# Patient Record
Sex: FEMALE | Race: WHITE | NOT HISPANIC OR LATINO | Employment: UNEMPLOYED | URBAN - NONMETROPOLITAN AREA
[De-identification: names, ages, dates, MRNs, and addresses within clinical notes are randomized per-mention and may not be internally consistent; named-entity substitution may affect disease eponyms.]

---

## 2022-07-25 ENCOUNTER — TRANSCRIBE ORDERS (OUTPATIENT)
Dept: PHYSICAL THERAPY | Facility: HOSPITAL | Age: 22
End: 2022-07-25

## 2022-07-25 DIAGNOSIS — M41.9 SCOLIOSIS, UNSPECIFIED SCOLIOSIS TYPE, UNSPECIFIED SPINAL REGION: Primary | ICD-10-CM

## 2022-09-15 ENCOUNTER — HOSPITAL ENCOUNTER (OUTPATIENT)
Dept: PHYSICAL THERAPY | Facility: HOSPITAL | Age: 22
Setting detail: THERAPIES SERIES
Discharge: HOME | End: 2022-09-15

## 2022-09-15 DIAGNOSIS — M41.9 SCOLIOSIS, UNSPECIFIED SCOLIOSIS TYPE, UNSPECIFIED SPINAL REGION: Primary | ICD-10-CM

## 2022-09-15 PROCEDURE — 97110 THERAPEUTIC EXERCISES: CPT

## 2022-09-15 PROCEDURE — 97161 PT EVAL LOW COMPLEX 20 MIN: CPT

## 2022-09-15 NOTE — THERAPY EVALUATION
Outpatient Physical Therapy Ortho Initial Evaluation  [facility]     Patient Name: Christen Nicholas  : 2000  MRN: [id number]  Today's Date: 9/15/2022      Visit Date: 09/15/2022   Patient seen for 1 PT sessions.  Patient reports N/A% of improvement.  Next MD appt: CECY   Recertification: 10/6/2022     Therapy Diagnosis: Thoracic and lumbar mobility deficits 2/2 R scoliosis     Barriers to Rehab: Include structural changes that have occurred within the spine, The chronicity of this issue.     Safety Issues: None noted.     No Known Allergies    There is no problem list on file for this patient.       Past Medical History:   Diagnosis Date   • Anxiety    • Depression         History reviewed. No pertinent surgical history.    Visit Dx:     ICD-10-CM ICD-9-CM   1. Scoliosis, unspecified scoliosis type, unspecified spinal region  M41.9 737.30          Patient History     Row Name 09/15/22 1300             History    Chief Complaint Pain;Numbness  -AC      Type of Pain Back pain  -AC      Brief Description of Current Complaint Pt reports she has scoliosis. Pt reports her pain has gotten worse over the past 2 years. Pt reports numbness in lower back and hips. Reports it is painful driving for long periods of time.  -AC      Patient/Caregiver Goals Relieve pain;Return to prior level of function;Improve mobility;Improve strength  -AC      Patient's Rating of General Health Good  -AC      Hand Dominance right-handed  -AC      Patient seeing anyone else for problem(s)? no  -AC      How has patient tried to help current problem? heating pad and ipubrofen  -AC      What clinical tests have you had for this problem? X-ray  -AC      Results of Clinical Tests see EMR  -AC      Surgery/Hospitalization No  -AC              Pain     Pain Location Back  -AC      Pain at Present 3  -AC      Pain at Best 2  -AC      Pain at Worst 6  -AC      Pain Frequency Intermittent  -AC      Pain Description Numbness;Aching;Dull  -AC       What Performance Factors Make the Current Problem(s) WORSE? sitting in the car, lifting objects  -AC      What Performance Factors Make the Current Problem(s) BETTER? stretching  -AC      Tolerance Time- Standing 10-15 mins  -AC      Tolerance Time- Sitting 30 mins; if in car immediate  -AC      Is your sleep disturbed? No  -AC      Is medication used to assist with sleep? No  -AC              Fall Risk Assessment    Any falls in the past year: No  -AC              Daily Activities    Primary Language English  -AC            User Key  (r) = Recorded By, (t) = Taken By, (c) = Cosigned By    Initials Name Provider Type    AC Carlota Franklin, PT Physical Therapist                 PT Ortho     Row Name 09/15/22 1400       Posture/Observations    Posture/Observations Comments Pt does not appear to be in any acute distress. Pt presents with R scoliosis. R shoulder slightly higher than L. R iliac crest higher than L. Pt demo increased forward head posture and rounded shoulders.  -AC       Sensory Screen for Light Touch- Upper Quarter Clearing    C4 (posterior shoulder) Bilateral:;Intact  -AC    C5 (lateral upper arm) Bilateral:;Intact  -AC    C6 (tip of thumb) Bilateral:;Intact  -AC    C7 (tip of 3rd finger) Bilateral:;Intact  -AC    C8 (tip of 5th finger) Bilateral:;Intact  -AC    T1 (medial lower arm) Bilateral:;Intact  -AC       Myotomal Screen- Upper Quarter Clearing    Shoulder flexion (C5) Bilateral:;4 (Good)  -AC    Elbow flexion/wrist extension (C6) Bilateral:;4 (Good)  -AC    Elbow extension/wrist flexion (C7) Bilateral:;4 (Good)  -AC    Finger flexion/ (C8) Bilateral:;4 (Good)  -AC    Finger abduction (T1) Bilateral:;4 (Good)  -AC     Bilateral:;4 (Good)  -AC       Cervical/Shoulder ROM Screen    Cervical flexion Normal  -AC    Cervical extension Normal  -AC    Cervical lateral flexion Normal  -AC    Cervical rotation Normal  -AC       Sensory Screen for Light Touch- Lower Quarter Clearing    L1  (inguinal area) Bilateral:;Intact  -AC    L2 (anterior mid thigh) Bilateral:;Intact  -AC    L3 (distal anterior thigh) Bilateral:;Intact  -AC    L4 (medial lower leg/foot) Bilateral:;Intact  -AC    L5 (lateral lower leg/great toe) Bilateral:;Intact  -AC    S1 (bottom of foot) Bilateral:;Intact  -AC       Myotomal Screen- Lower Quarter Clearing    Hip flexion (L2) Bilateral:;4 (Good)  -AC    Knee extension (L3) Bilateral:;5 (Normal)  -AC    Ankle DF (L4) Bilateral:;5 (Normal)  -AC    Great toe extension (L5) Bilateral:;5 (Normal)  -AC    Ankle PF (S1) Bilateral:;5 (Normal)  -AC    Knee flexion (S2) Bilateral:;5 (Normal)  -AC       Lumbar ROM Screen- Lower Quarter Clearing    Lumbar Flexion Impaired  65°  -AC    Lumbar Extension Impaired  20°  -AC    Lumbar Lateral Flexion Impaired  50% impairment  -AC    Lumbar Rotation Impaired  50% impairment  -AC       SI/Hip Screen- Lower Quarter Clearing    Los's/Roddy's test Bilateral:;Negative  -AC       Thoracic Accessory Motions    PA glide- T3 Hypomobile  -AC    PA glide- T4 Hypomobile  -AC    PA glide- T5 Hypomobile  -AC    PA glide- T6 Hypomobile  -AC    PA glide- T7 Hypomobile  -AC    PA glide- T8 Hypomobile  -AC    PA glide- T9 Hypomobile  -AC    PA glide- T10 Hypomobile  -AC    PA glide- T11 Hypomobile  -AC    PA glide- T12 Hypomobile  -AC       General ROM    GENERAL ROM COMMENTS AROM lumbar flex 65°, lumbar ext 20, 50% B lumbar lat flex, and lumbar rot; all B LE and UE AROM WFL. Thoracic flexion AROM 50%, thoracic extension 25%, thoracic B lat flex 50%, thoracic B rot 50%.  -AC       MMT (Manual Muscle Testing)    General MMT Comments All B UE MMT 4/5. All B LE MMT 5/5 except hip flexion 4/5.  -AC       Sensation    Sensation WNL? WNL  -AC    Row Name 09/15/22 1300       Subjective Comments    Subjective Comments see subjective history  -AC       Subjective Pain    Able to rate subjective pain? yes  -AC    Pre-Treatment Pain Level 3  -AC    Post-Treatment Pain  Level 1  -AC       Upper Extremity Flexibility    Pect Minor Bilateral:;Mildly limited  -AC    Pect Major Bilateral:;Mildly limited  -AC       Lower Extremity Flexibility    Hamstrings Moderately limited  -AC    Hip Flexors Moderately limited  -AC    Hip Internal Rotators Moderately limited  -AC    Quadratus Lumborum Bilateral:;Moderately limited  -AC       Balance Skills Training    Balance Comments Pt balance good. Pt able to sit independently with no UE support. Pt able to stand independently with no HHA or AD.  -AC       Transfers    Comment, (Transfers) I with all transfers.  -AC       Gait/Stairs (Locomotion)    Comment, (Gait/Stairs) I with ambulation into clinic. No AD requried. No gait deviations noted. Increased rounded shoulders and forward head posture present.  -          User Key  (r) = Recorded By, (t) = Taken By, (c) = Cosigned By    Initials Name Provider Type    Carlota Ulloa, PT Physical Therapist                            Therapy Education  Given: HEP, Symptoms/condition management, Posture/body mechanics  Program: New  How Provided: Demonstration, Written, Verbal  Provided to: Patient  Level of Understanding: Teach back education performed, Verbalized, Demonstrated      PT OP Goals     Row Name 09/15/22 1400          PT Short Term Goals    STG Date to Achieve 10/06/22  -     STG 1 Pt to be I with HEP with additions prior to next recertification.  -     STG 2 Pt to be able to perform 1 min plank with no difficulty or pain present.  -     STG 3 Pt to demo good lifting mechanics with no cueing from PT.  -     STG 4 Pt to be able to tolerate >/= 10 mins seated in car with no pain reported.  -     STG 5 Pt to improve lumbar flexion AROM to >/= 75°.  -            Long Term Goals    LTG 1 Pt to be I with final HEP with good form and no cueing from PT.  -     LTG 2 Pt to subjectively report 75% improvement since initial eval indicating improved function.  -     LTG 3 Pt to  improve B UE and B LE MMT to 5/5.  -AC     LTG 4 Pt to reduce EFREN score to </= 5% indicating no impairments and able to perform all daily tasks with no difficulty.  -     LTG 5 Pt to report no tenderness to B QL upon palpation indicating symptom reduction.  -AC            Time Calculation    PT Goal Re-Cert Due Date 10/06/22  -           User Key  (r) = Recorded By, (t) = Taken By, (c) = Cosigned By    Initials Name Provider Type     Carlota Franklin, PT Physical Therapist                 PT Assessment/Plan     Row Name 09/15/22 1400          PT Assessment    Functional Limitations Performance in self-care ADL;Performance in sport activities;Limitation in home management;Performance in leisure activities  -     Impairments Impaired flexibility;Impaired muscle endurance;Impaired muscle length;Impaired muscle power;Joint mobility;Pain;Muscle strength;Posture;Poor body mechanics;Range of motion  -     Assessment Comments Ms. Velásquez is a 22 y.o. female who presents to PT with R scoliosis. Pt has structural and postural deformities with R side higher than the L. Pt presents with decreased thoracic and lumbar AROM, B UE strength deficits, poor postural awareness, and tenderness and taut bands to L erector spinae and B QL L>R. Pt also has hypomobility through T-spine when performing PA glides. Pt reports her goal for PT is to be able to perform all activities with no pain especially driving in her car. Pt would benefit from skilled PT to address the deficits listed above and increase pt mobility, flexibility, and strength and prevention to worsening symptoms. Pt was provided with HEP this date. Pt educated on POC and symptom management.  -AC     Please refer to paper survey for additional self-reported information Yes  -AC     Rehab Potential Good  -AC     Patient/caregiver participated in establishment of treatment plan and goals Yes  -AC     Patient would benefit from skilled therapy intervention Yes  -AC       "      PT Plan    PT Frequency 2x/week  -AC     Predicted Duration of Therapy Intervention (PT) 6-8 visits  -AC     Planned CPT's? PT EVAL LOW COMPLEXITY: 47613;PT RE-EVAL: 44932;PT THER PROC EA 15 MIN: 18674;PT THER ACT EA 15 MIN: 15014;PT MANUAL THERAPY EA 15 MIN: 28797;PT GAIT TRAINING EA 15 MIN: 76182;PT SELF CARE/HOME MGMT/TRAIN EA 15: 07237;PT HOT OR COLD PACK TREAT MCARE  -AC     PT Plan Comments Next visit assess leg length, and core stability. Progress overall flexibility and B UE/LE strength and thoracic and lumbar mobility. Next visit add side plank, postural re-ed exercises, and thoracic mobility.  -AC           User Key  (r) = Recorded By, (t) = Taken By, (c) = Cosigned By    Initials Name Provider Type     Carlota Franklin, PT Physical Therapist                   OP Exercises     Row Name 09/15/22 1400 09/15/22 1300          Subjective Comments    Subjective Comments -- see subjective history  -AC            Subjective Pain    Able to rate subjective pain? -- yes  -AC     Pre-Treatment Pain Level -- 3  -AC     Post-Treatment Pain Level -- 1  -AC            Exercise 1    Exercise Name 1 open books  -AC --     Reps 1 10 each side  -AC --     Time 1 5\" hold  -AC --     Additional Comments sidelying  -AC --            Exercise 2    Exercise Name 2 child's pose towards R  -AC --     Reps 2 2  -AC --     Time 2 30 sec hold  -AC --            Exercise 3    Exercise Name 3 angry cat stretch  -AC --     Reps 3 10  -AC --     Time 3 5\" hold  -AC --            Exercise 4    Exercise Name 4 St. HS stretch  -AC --            Exercise 5    Exercise Name 5 Shoulder squeezes  -AC --     Reps 5 10  -AC --     Time 5 3\" hold  -AC --            Exercise 6    Exercise Name 6 Doorway stretch  -AC --     Sets 6 2  -AC --     Time 6 30 sec hold  -AC --            Exercise 7    Exercise Name 7 Pt ed on POC, HEP, anatomical abnormalities, and symptom management  -AC --     Time 7 5 mins  -AC --           User Key  (r) = " Recorded By, (t) = Taken By, (c) = Cosigned By    Initials Name Provider Type     Carlota Franklin, PT Physical Therapist              Manual Rx (last 36 hours)     Manual Treatments     Row Name 09/15/22 1500             Manual Rx 1    Manual Rx 1 Location B ES assessment  -AC              Manual Rx 2    Manual Rx 2 Location B QL assessment  -AC              Manual Rx 3    Manual Rx 3 Location PA glide to T-spine (T1-T12)  -AC              Manual Rx 4    Manual Rx 4 Location PA glide to lumbar spine (L1-L5)  -AC            User Key  (r) = Recorded By, (t) = Taken By, (c) = Cosigned By    Initials Name Provider Type     Carlota Franklin, PT Physical Therapist                            Outcome Measure Options: Modified Oswestry  Modified Oswestry  Modified Oswestry Score/Comments: 8/50 = 16%      Time Calculation:     Start Time: 1348  Stop Time: 1443  Time Calculation (min): 55 min     Therapy Charges for Today     Code Description Service Date Service Provider Modifiers Qty    46081775304 HC PT THER SUPP EA 15 MIN 9/15/2022 Carlota Franklin, PT GP 2    39285647318 HC PT EVAL LOW COMPLEXITY 2 9/15/2022 FranklinCarlota, PT GP 1    19919791268 HC PT THER PROC EA 15 MIN 9/15/2022 Carlota Franklin, PT GP 2          PT G-Codes  Outcome Measure Options: Modified Oswestry  Modified Oswestry Score/Comments: 8/50 = 16%         Carlota Franklin PT , DPT 9/15/2022

## 2022-09-16 ENCOUNTER — HOSPITAL ENCOUNTER (OUTPATIENT)
Dept: PHYSICAL THERAPY | Facility: HOSPITAL | Age: 22
Setting detail: THERAPIES SERIES
Discharge: HOME | End: 2022-09-16

## 2022-09-16 DIAGNOSIS — M41.9 SCOLIOSIS, UNSPECIFIED SCOLIOSIS TYPE, UNSPECIFIED SPINAL REGION: Primary | ICD-10-CM

## 2022-09-16 PROCEDURE — 97110 THERAPEUTIC EXERCISES: CPT

## 2022-09-16 NOTE — THERAPY TREATMENT NOTE
"    Outpatient Physical Therapy Ortho Treatment Note  [facility]     Patient Name: Christen Nicholas  : 2000  MRN: [id number]  Today's Date: 2022      Visit Date: 2022   Patient seen for 2 PT sessions.  Patient reports N/A% of improvement.  Next MD appt: CECY   Recertification: 10/6/2022     Therapy Diagnosis: Thoracic and lumbar mobility deficits 2/2 R scoliosis      Barriers to Rehab: Include structural changes that have occurred within the spine, The chronicity of this issue.     Safety Issues: None noted.     Visit Dx:    ICD-10-CM ICD-9-CM   1. Scoliosis, unspecified scoliosis type, unspecified spinal region  M41.9 737.30       There is no problem list on file for this patient.       Past Medical History:   Diagnosis Date   • Anxiety    • Depression         No past surgical history on file.     PT Ortho     Row Name 22 07       Subjective Comments    Subjective Comments Pt reports she is doing okay this AM. Reports no issues since eval yesterday. Reports exercises are going well. \"Feels better for sure\".  -AC       Subjective Pain    Able to rate subjective pain? yes  -AC    Pre-Treatment Pain Level 3  -AC    Post-Treatment Pain Level 2  -AC          User Key  (r) = Recorded By, (t) = Taken By, (c) = Cosigned By    Initials Name Provider Type    Carlota Ulloa, PT Physical Therapist                             PT Assessment/Plan     Row Name 22          PT Assessment    Assessment Comments Pt tolerated tx well this date with no adverse response. Pt required tactile and verbal cues during low tband row for postural correction. Once corrected form improved. Pt responded well to manual overpressure with R SB over bolster for increased stretch to L side of trunk. Reinforced HEP and symptom management at end of tx.  -AC     Rehab Potential Good  -AC     Patient/caregiver participated in establishment of treatment plan and goals Yes  -AC     Patient would benefit from " "skilled therapy intervention Yes  -AC            PT Plan    PT Frequency 2x/week  -AC     PT Plan Comments Next visit add seated SB stretch, continue core stability exercises, single arm row. Continue tspine mobility.  -AC           User Key  (r) = Recorded By, (t) = Taken By, (c) = Cosigned By    Initials Name Provider Type    AC Carlota Franklin, PT Physical Therapist                   OP Exercises     Row Name 09/16/22 0700             Subjective Comments    Subjective Comments Pt reports she is doing okay this AM. Reports no issues since eval yesterday. Reports exercises are going well. \"Feels better for sure\".  -AC              Subjective Pain    Able to rate subjective pain? yes  -AC      Pre-Treatment Pain Level 3  -AC      Post-Treatment Pain Level 2  -AC              Exercise 1    Exercise Name 1 Pro II LE bike for strength, muscle re-ed, and warmup  -AC      Time 1 10 mins  -AC      Additional Comments L 4.0  -AC              Exercise 2    Exercise Name 2 T band low row  -AC      Sets 2 2  -AC      Reps 2 10  -AC      Additional Comments RTB  -AC              Exercise 3    Exercise Name 3 R SB over towel roll with manual stretch  -AC      Time 3 5 mins  -AC      Additional Comments manual overpressure by PT  -AC              Exercise 4    Exercise Name 4 open books  -AC      Reps 4 10 each side  -AC      Time 4 5\" hold  -AC      Additional Comments sidelying  -AC              Exercise 5    Exercise Name 5 child's pose towards R side  -AC      Reps 5 2  -AC      Time 5 1 min hold  -AC              Exercise 6    Exercise Name 6 angry cat stretch  -AC      Reps 6 20  -AC      Time 6 5\" hold  -AC              Exercise 7    Exercise Name 7 St. HS stretch  -AC      Sets 7 2  -AC      Time 7 30 sec hold  -AC              Exercise 8    Exercise Name 8 Half kneel side plank on R side  -AC      Sets 8 4  -AC      Time 8 20 sec hold  -AC              Exercise 9    Exercise Name 9 PPT  -AC      Reps 9 20  -AC      " "Time 9 5\" hold  -AC              Exercise 10    Exercise Name 10 Doorway stretch  -      Sets 10 2  -AC      Time 10 1 min hold  -AC              Exercise 11    Exercise Name 11 half kneeling hip flexor stretch with Side bend  -      Reps 11 2  -AC      Time 11 1 min hold  -AC      Additional Comments towards R side  -            User Key  (r) = Recorded By, (t) = Taken By, (c) = Cosigned By    Initials Name Provider Type     Carlota Franklin, PT Physical Therapist                              PT OP Goals     Row Name 09/16/22 0700          PT Short Term Goals    STG Date to Achieve 10/06/22  -     STG 1 Pt to be I with HEP with additions prior to next recertification.  -     STG 2 Pt to be able to perform 1 min plank with no difficulty or pain present.  -     STG 3 Pt to demo good lifting mechanics with no cueing from PT.  -     STG 4 Pt to be able to tolerate >/= 10 mins seated in car with no pain reported.  -     STG 5 Pt to improve lumbar flexion AROM to >/= 75°.  -            Long Term Goals    LTG 1 Pt to be I with final HEP with good form and no cueing from PT.  -     LTG 2 Pt to subjectively report 75% improvement since initial eval indicating improved function.  -     LTG 3 Pt to improve B UE and B LE MMT to 5/5.  -     LTG 4 Pt to reduce EFREN score to </= 5% indicating no impairments and able to perform all daily tasks with no difficulty.  -     LTG 5 Pt to report no tenderness to B QL upon palpation indicating symptom reduction.  -            Time Calculation    PT Goal Re-Cert Due Date 10/06/22  -           User Key  (r) = Recorded By, (t) = Taken By, (c) = Cosigned By    Initials Name Provider Type     Carlota Franklin, PT Physical Therapist                               Time Calculation:   Start Time: 0745  Stop Time: 0832  Time Calculation (min): 47 min  Therapy Charges for Today     Code Description Service Date Service Provider Modifiers Qty    61016122904  PT " THER SUPP EA 15 MIN 9/16/2022 Copeland, Autumn, PT GP 1    87982162650 HC PT THER PROC EA 15 MIN 9/16/2022 Copeland, Autumn, PT GP 3                    Autumn Franklin, PT , DPT 9/16/2022

## 2022-09-22 ENCOUNTER — APPOINTMENT (OUTPATIENT)
Dept: PHYSICAL THERAPY | Facility: HOSPITAL | Age: 22
End: 2022-09-22

## 2022-09-23 ENCOUNTER — APPOINTMENT (OUTPATIENT)
Dept: PHYSICAL THERAPY | Facility: HOSPITAL | Age: 22
End: 2022-09-23

## 2022-09-27 ENCOUNTER — HOSPITAL ENCOUNTER (OUTPATIENT)
Dept: PHYSICAL THERAPY | Facility: HOSPITAL | Age: 22
Setting detail: THERAPIES SERIES
Discharge: HOME | End: 2022-09-27

## 2022-09-27 DIAGNOSIS — M41.9 SCOLIOSIS, UNSPECIFIED SCOLIOSIS TYPE, UNSPECIFIED SPINAL REGION: Primary | ICD-10-CM

## 2022-09-27 PROCEDURE — 97110 THERAPEUTIC EXERCISES: CPT

## 2022-09-27 NOTE — THERAPY TREATMENT NOTE
Outpatient Physical Therapy Ortho Treatment Note  [facility]     Patient Name: Christen Nicholas  : 2000  MRN: [id number]  Today's Date: 2022      Visit Date: 2022  Patient seen for 3 PT sessions.  Patient reports N/A% of improvement.  Next MD appt: CECY   Recertification: 10/6/2022     Therapy Diagnosis: Thoracic and lumbar mobility deficits 2/2 R scoliosis      Barriers to Rehab: Include structural changes that have occurred within the spine, The chronicity of this issue.     Safety Issues: None noted.     Visit Dx:    ICD-10-CM ICD-9-CM   1. Scoliosis, unspecified scoliosis type, unspecified spinal region  M41.9 737.30       There is no problem list on file for this patient.       Past Medical History:   Diagnosis Date   • Anxiety    • Depression         No past surgical history on file.     PT Ortho     Row Name 22 1300       Subjective Comments    Subjective Comments Pt reports no issues over the weekend. Pt states her HEP is going good. No other new complaints.  -AC       Subjective Pain    Able to rate subjective pain? yes  -AC    Pre-Treatment Pain Level 3  -AC    Post-Treatment Pain Level 2  -AC          User Key  (r) = Recorded By, (t) = Taken By, (c) = Cosigned By    Initials Name Provider Type    Carlota Ulloa, PT Physical Therapist                             PT Assessment/Plan     Row Name 22 1400          PT Assessment    Assessment Comments Pt with good effort this date. Pt required additional cues for all exercises due to continued poor postural awareness. Will require continue reinforcement of proper scapular retraction with rows.  -AC     Rehab Potential Good  -AC     Patient/caregiver participated in establishment of treatment plan and goals Yes  -AC     Patient would benefit from skilled therapy intervention Yes  -AC            PT Plan    PT Frequency 2x/week  -AC     PT Plan Comments Next visit add fwd plank, bird dogs. Assess erector spinae musculature.  "MT as needed. Attempt lumbar roll.  -AC           User Key  (r) = Recorded By, (t) = Taken By, (c) = Cosigned By    Initials Name Provider Type    Carlota Ulloa PT Physical Therapist                   OP Exercises     Row Name 09/27/22 1300             Subjective Comments    Subjective Comments Pt reports no issues over the weekend. Pt states her HEP is going good. No other new complaints.  -AC              Subjective Pain    Able to rate subjective pain? yes  -AC      Pre-Treatment Pain Level 3  -AC      Post-Treatment Pain Level 2  -AC              Exercise 1    Exercise Name 1 Pro II LE bike for strength, muscle re-ed, and warmup  -AC      Time 1 10 mins  -AC      Additional Comments L 4.0  -AC              Exercise 2    Exercise Name 2 T band single arm row  -AC      Reps 2 20 each side  -AC      Additional Comments RTB  -AC              Exercise 3    Exercise Name 3 R SB over bolster  -AC      Time 3 3 mins  -AC              Exercise 4    Exercise Name 4 open books  -AC      Reps 4 10 each side  -AC      Time 4 5\" hold  -AC      Additional Comments sidelying  -AC              Exercise 5    Exercise Name 5 child's pose towards R side  -AC      Reps 5 2  -AC      Time 5 1 min hold  -AC              Exercise 6    Exercise Name 6 angry cat stretch  -AC      Reps 6 20  -AC      Time 6 5\" hold  -AC              Exercise 7    Exercise Name 7 St. HS stretch  -AC      Sets 7 2  -AC      Time 7 30 sec hold  -AC              Exercise 8    Exercise Name 8 Half kneel side plank on R side  -AC      Sets 8 4  -AC      Time 8 20 sec hold  -AC              Exercise 9    Exercise Name 9 Bridge  -AC      Sets 9 3  -AC      Reps 9 10  -AC      Time 9 3\" hold  -AC              Exercise 10    Exercise Name 10 thread the needle  -AC      Sets 10 --  -AC      Reps 10 10 each side  -AC      Time 10 --  -AC              Exercise 11    Exercise Name 11 --  -AC      Sets 11 --  -AC      Reps 11 --  -AC      Time 11 --  -AC         "    User Key  (r) = Recorded By, (t) = Taken By, (c) = Cosigned By    Initials Name Provider Type    Carlota Ulloa PT Physical Therapist                              PT OP Goals     Row Name 09/27/22 1400 09/27/22 1300       PT Short Term Goals    STG Date to Achieve 10/06/22  -AC --    STG 1 Pt to be I with HEP with additions prior to next recertification.  -AC --    STG 1 Progress Progressing  -AC --    STG 2 Pt to be able to perform 1 min plank with no difficulty or pain present.  -AC --    STG 2 Progress Progressing  -AC --    STG 3 Pt to demo good lifting mechanics with no cueing from PT.  -AC --    STG 4 Pt to be able to tolerate >/= 10 mins seated in car with no pain reported.  -AC --    STG 5 Pt to improve lumbar flexion AROM to >/= 75°.  -AC --       Long Term Goals    LTG 1 Pt to be I with final HEP with good form and no cueing from PT.  -AC --    LTG 2 Pt to subjectively report 75% improvement since initial eval indicating improved function.  -AC --    LTG 3 Pt to improve B UE and B LE MMT to 5/5.  -AC --    LTG 4 Pt to reduce EFREN score to </= 5% indicating no impairments and able to perform all daily tasks with no difficulty.  -AC --    LTG 5 Pt to report no tenderness to B QL upon palpation indicating symptom reduction.  -AC --       Time Calculation    PT Goal Re-Cert Due Date -- 10/06/22  -          User Key  (r) = Recorded By, (t) = Taken By, (c) = Cosigned By    Initials Name Provider Type    Carlota Ulloa PT Physical Therapist                               Time Calculation:   Start Time: 1348  Stop Time: 1433  Time Calculation (min): 45 min  Therapy Charges for Today     Code Description Service Date Service Provider Modifiers Qty    48083289662 HC PT THER SUPP EA 15 MIN 9/27/2022 Carlota Franklin PT GP 1    76860342452 HC PT THER PROC EA 15 MIN 9/27/2022 Carlota Franklin PT GP 3                    Carlota Franklin PT , DPT 9/27/2022

## 2022-09-29 ENCOUNTER — HOSPITAL ENCOUNTER (OUTPATIENT)
Dept: PHYSICAL THERAPY | Facility: HOSPITAL | Age: 22
Setting detail: THERAPIES SERIES
Discharge: HOME | End: 2022-09-29

## 2022-09-29 DIAGNOSIS — M41.9 SCOLIOSIS, UNSPECIFIED SCOLIOSIS TYPE, UNSPECIFIED SPINAL REGION: Primary | ICD-10-CM

## 2022-09-29 PROCEDURE — 97110 THERAPEUTIC EXERCISES: CPT

## 2022-09-29 NOTE — THERAPY TREATMENT NOTE
Outpatient Physical Therapy Ortho Treatment Note  [facility]     Patient Name: Christen Nicholas  : 2000  MRN: [id number]  Today's Date: 2022     Pt seen for 4 PT sessions  Reported Improvement:  N/A %  MD Visit: TBD  Recheck Date: 10/06/2022    Therapy Diagnosis:   Thoracic and lumbar mobility deficits 2/2 R scoliosis         Visit Date: 2022    Visit Dx:    ICD-10-CM ICD-9-CM   1. Scoliosis, unspecified scoliosis type, unspecified spinal region  M41.9 737.30       There is no problem list on file for this patient.       Past Medical History:   Diagnosis Date   • Anxiety    • Depression         No past surgical history on file.                     PT Assessment/Plan     Row Name 22 1400          PT Assessment    Assessment Comments Pt with good effort with stretches.  Requires cues for standing scap stab for postural improvements.  Performed full plank with scapular winging.  Modified to low plank with improved scap / posture stabilization.   Tolerated addition of new scap stab with tband  therex well.  -JW            PT Plan    PT Frequency 2x/week  -JW     PT Plan Comments Next visit attemp Bird Dogs.  -           User Key  (r) = Recorded By, (t) = Taken By, (c) = Cosigned By    Initials Name Provider Type    Anum Winters, [place] Physical Therapist Assistant                   OP Exercises     Row Name 22 1400             Subjective Comments    Subjective Comments Pt states she is a little sore but not too bad.  -              Subjective Pain    Able to rate subjective pain? yes  -      Pre-Treatment Pain Level 3  -      Post-Treatment Pain Level 2  -              Exercise 1    Exercise Name 1 Pro II LE bike for strength, muscle re-ed, and warmup  -JW      Time 1 10 mins  -JW              Exercise 2    Exercise Name 2 B st HS st  -JW      Reps 2 2  -JW      Time 2 30  -JW              Exercise 3    Exercise Name 3 Bridges  -JW      Sets 3 2  -JW      Reps 3 10  -JW    "           Exercise 4    Exercise Name 4 90/90 Heel taps  -JW      Sets 4 2  -JW      Reps 4 10  -JW              Exercise 5    Exercise Name 5 OPen Books  -JW      Reps 5 10 ea side  -JW      Time 5 5\" hold  -JW              Exercise 6    Exercise Name 6 Child's Pose - toward R side  -JW      Reps 6 2  -JW      Time 6 1 min hold  -JW              Exercise 7    Exercise Name 7 Angry cat st  -JW      Sets 7 2  -JW      Reps 7 5  -JW              Exercise 8    Exercise Name 8 Low plank  -JW      Reps 8 5  -JW      Time 8 10\" hold  -JW              Exercise 9    Exercise Name 9 Tband Rows; Low  -JW      Sets 9 2  -JW      Reps 9 10  -JW      Additional Comments GTB  -JW              Exercise 10    Exercise Name 10 Tband Clocks  -JW      Reps 10 10  -JW      Additional Comments RTB  -JW            User Key  (r) = Recorded By, (t) = Taken By, (c) = Cosigned By    Initials Name Provider Type    Anum Winters PTA Physical Therapist Assistant                              PT OP Goals     Row Name 09/29/22 1400          Time Calculation    PT Goal Re-Cert Due Date 10/06/22  -JW           User Key  (r) = Recorded By, (t) = Taken By, (c) = Cosigned By    Initials Name Provider Type    Anum Winters PTA Physical Therapist Assistant                Therapy Education  Education Details: Door loop, red tband for low rows and bows/arrows.  Given: HEP  Program: Reinforced, Progressed, New  How Provided: Verbal, Demonstration, Written  Provided to: Patient  Level of Understanding: Verbalized, Teach back education performed, Demonstrated              Time Calculation:   Start Time: 1349  Stop Time: 1440  Time Calculation (min): 51 min  Therapy Charges for Today     Code Description Service Date Service Provider Modifiers Qty    53489786448 HC PT THER PROC EA 15 MIN 9/29/2022 Anum Villalobos PTA GP, CQ 3    82999096511 HC PT THER SUPP EA 15 MIN 9/29/2022 Anum Villalobos PTA GP, CQ 1                    Anum Villalobos, " PTA  9/29/2022

## 2022-10-05 ENCOUNTER — HOSPITAL ENCOUNTER (OUTPATIENT)
Dept: PHYSICAL THERAPY | Facility: HOSPITAL | Age: 22
Setting detail: THERAPIES SERIES
Discharge: HOME | End: 2022-10-05

## 2022-10-05 DIAGNOSIS — M41.9 SCOLIOSIS, UNSPECIFIED SCOLIOSIS TYPE, UNSPECIFIED SPINAL REGION: Primary | ICD-10-CM

## 2022-10-05 PROCEDURE — 97110 THERAPEUTIC EXERCISES: CPT

## 2022-10-05 NOTE — THERAPY TREATMENT NOTE
Outpatient Physical Therapy Ortho Treatment Note  [facility]     Patient Name: Christen Nicholas  : 2000  MRN: [id number]  Today's Date: 10/5/2022     Pt seen for 5 PT sessions  Reported Improvement:  60 %  MD Visit: CECY  Recheck Date: 10/06/2022    Therapy Diagnosis:   Thoracic and lumbar mobility deficits 2/2 R scoliosis         Visit Date: 10/05/2022    Visit Dx:    ICD-10-CM ICD-9-CM   1. Scoliosis, unspecified scoliosis type, unspecified spinal region  M41.9 737.30       There is no problem list on file for this patient.       Past Medical History:   Diagnosis Date   • Anxiety    • Depression         No past surgical history on file.                     PT Assessment/Plan     Row Name 10/05/22 1500          PT Assessment    Assessment Comments Pt reports daily compliance with curretn HEP.  States she did utilize her stretches after increased pain from car ride yesterday.  Requires cues for level pelvis in QP position and struggles with bird dogs.  Increased time in low plank this date.  No reports of increased pain.  -            PT Plan    PT Frequency 2x/week  -     PT Plan Comments next visit add lifting ergonomic training  -           User Key  (r) = Recorded By, (t) = Taken By, (c) = Cosigned By    Initials Name Provider Type    Anum Winters PTA Physical Therapist Assistant                   OP Exercises     Row Name 10/05/22 1400             Subjective Comments    Subjective Comments Back is bothering me a little more than usual due to a lot of driving adn long itme spent in car yesterday.  -              Subjective Pain    Able to rate subjective pain? yes  -      Pre-Treatment Pain Level 5  -      Post-Treatment Pain Level 3  -              Exercise 1    Exercise Name 1 Pro II LE bike for strength, muscle re-ed, and warmup  -      Time 1 10 min  -      Additional Comments L 6.0  -              Exercise 2    Exercise Name 2 B st HS st  -      Reps 2 2  -JW       "Time 2 30  -JW              Exercise 3    Exercise Name 3 Open Books  -JW      Reps 3 10 each side  -JW      Time 3 5\" hold  -JW              Exercise 4    Exercise Name 4 DKTC over pball  -JW      Reps 4 20  -JW              Exercise 5    Exercise Name 5 Bridges over  pball  -JW      Sets 5 2  -JW      Reps 5 10  -JW      Time 5 5 \" hold  -JW              Exercise 6    Exercise Name 6 low planks  -JW      Reps 6 5  -JW      Time 6 20\" hold  -JW              Exercise 7    Exercise Name 7 Child's Pose - R side  -JW      Reps 7 2  -JW      Time 7 1 min ea  -JW              Exercise 8    Exercise Name 8 Thread the needle  -JW      Reps 8 10 reps ea side  -JW              Exercise 9    Exercise Name 9 Bird dogs  -JW      Sets 9 1  -JW      Reps 9 10  -JW              Exercise 10    Exercise Name 10 LTR  -JW      Reps 10 10  -JW      Time 10 10  -JW              Exercise 11    Exercise Name 11 Verbal review of tband scap stab HEP  -JW            User Key  (r) = Recorded By, (t) = Taken By, (c) = Cosigned By    Initials Name Provider Type    Anum Winters, PTA Physical Therapist Assistant                              PT OP Goals     Row Name 10/05/22 1400          PT Short Term Goals    STG Date to Achieve 10/06/22  -JW     STG 1 Pt to be I with HEP with additions prior to next recertification.  -JW     STG 1 Progress Met  -JW     STG 2 Pt to be able to perform 1 min plank with no difficulty or pain present.  -JW     STG 2 Progress Progressing  -     STG 3 Pt to demo good lifting mechanics with no cueing from PT.  -     STG 4 Pt to be able to tolerate >/= 10 mins seated in car with no pain reported.  -JW     STG 4 Progress Met  -JW     STG 4 Progress Comments 20-30 min tolerance reported  -     STG 5 Pt to improve lumbar flexion AROM to >/= 75°.  -            Long Term Goals    LTG 1 Pt to be I with final HEP with good form and no cueing from PT.  -JW     LTG 2 Pt to subjectively report 75% improvement since " initial eval indicating improved function.  -     LTG 2 Progress Progressing  -     LTG 3 Pt to improve B UE and B LE MMT to 5/5.  -     LTG 4 Pt to reduce EFREN score to </= 5% indicating no impairments and able to perform all daily tasks with no difficulty.  -     LTG 5 Pt to report no tenderness to B QL upon palpation indicating symptom reduction.  -            Time Calculation    PT Goal Re-Cert Due Date 10/06/22  -           User Key  (r) = Recorded By, (t) = Taken By, (c) = Cosigned By    Initials Name Provider Type    Anum Winters PTA Physical Therapist Assistant                Therapy Education  Education Details: Bird dogs  Given: Symptoms/condition management, Posture/body mechanics, HEP  Program: Reinforced, Progressed  How Provided: Verbal, Demonstration  Provided to: Patient  Level of Understanding: Verbalized, Teach back education performed              Time Calculation:   Start Time: 1430  Stop Time: 1514  Time Calculation (min): 44 min  Therapy Charges for Today     Code Description Service Date Service Provider Modifiers Qty    03526781634 HC PT THER PROC EA 15 MIN 10/5/2022 Anum Villalobos PTA GP, CQ 3    70847108143 HC PT THER SUPP EA 15 MIN 10/5/2022 Anum Villalobos PTA GP, CQ 1                    Anum Villalobos PTA  10/5/2022

## 2022-10-11 ENCOUNTER — HOSPITAL ENCOUNTER (OUTPATIENT)
Dept: PHYSICAL THERAPY | Facility: HOSPITAL | Age: 22
Setting detail: THERAPIES SERIES
Discharge: HOME | End: 2022-10-11

## 2022-10-11 DIAGNOSIS — M41.9 SCOLIOSIS, UNSPECIFIED SCOLIOSIS TYPE, UNSPECIFIED SPINAL REGION: Primary | ICD-10-CM

## 2022-10-11 PROCEDURE — 97110 THERAPEUTIC EXERCISES: CPT

## 2022-10-11 NOTE — THERAPY TREATMENT NOTE
Outpatient Physical Therapy Ortho Progress Note  [facility]     Patient Name: Christen Nicholas  : 2000  MRN: [id number]  Today's Date: 10/11/2022      Visit Date: 10/11/2022      Pt seen for 6 PT sessions  Reported Improvement:  60 %  MD Visit: TBD  Recheck Date: 2022      Therapy Diagnosis:   Thoracic and lumbar mobility deficits 2/2 R scoliosis     Visit Dx:    ICD-10-CM ICD-9-CM   1. Scoliosis, unspecified scoliosis type, unspecified spinal region  M41.9 737.30       There is no problem list on file for this patient.       Past Medical History:   Diagnosis Date   • Anxiety    • Depression         No past surgical history on file.     PT Ortho     Row Name 10/11/22 1300       Subjective Comments    Subjective Comments Reports back is feeling good today. states it only hurts the most when driving long distances. Pt subjectively reports 60% improvement since initial eval. Reports continued difficulty with sitting prolonged periods of time and core stability.  -AC       Subjective Pain    Able to rate subjective pain? yes  -AC    Pre-Treatment Pain Level 2  -AC    Post-Treatment Pain Level 2  -AC       Posture/Observations    Posture/Observations Comments Pt does not appear to be in any acute distress. Pt presents with R scoliosis. R shoulder slightly higher than L. R iliac crest higher than L. Pt demo increased forward head posture and rounded shoulders.  -AC       Cervical/Shoulder ROM Screen    Cervical flexion Normal  -AC    Cervical extension Normal  -AC    Cervical lateral flexion Normal  -AC    Cervical rotation Normal  -AC    Shoulder elevation  Normal  -AC       Head/Neck/Trunk    Trunk Extension AROM 25°  -AC    Trunk Flexion AROM 70°  -AC    Trunk Lt Lateral Flexion AROM 100%  -AC    Trunk Rt Lateral Flexion AROM 100%  -AC    Trunk Lt Rotation AROM 100%  -AC    Trunk Rt Rotation AROM 100%  -AC    Head/Neck/Trunk Comments All t-spine AROM 75% except t-spine ext 50%.  -AC       MMT (Manual  Muscle Testing)    General MMT Comments All B UE MMT 4+/5. All B LE MMT 5/5.  -AC       Sensation    Sensation WNL? WNL  -AC       Upper Extremity Flexibility    Pect Minor Bilateral:;Mildly limited  -AC    Pect Major Bilateral:;Mildly limited  -AC       Lower Extremity Flexibility    Hamstrings Moderately limited  -AC    Hip Flexors Moderately limited  -AC          User Key  (r) = Recorded By, (t) = Taken By, (c) = Cosigned By    Initials Name Provider Type     Carlota Franklin, PT Physical Therapist                             PT Assessment/Plan     Row Name 10/11/22 1400          PT Assessment    Assessment Comments Pt subjectively reports 60% improvement since initial eval. Pt has met 4/10 goals and is progressing well to meet remaining. Pt B LE strength has improved to 5/5 and B UE 4+/5. Pt also has improved trunk AROM. Pt continues to have difficulty with core stability and strength exercises such as low plank and bird dogs. Pt continue to lose TA activation and increased ant pelvic tilt with bird dogs this date thus replaced with quadruped arm raises. Pt able to maintain neutral spine and TA contraction with quadruped arm raise exercise. Pt would benefit from continued skilled PT to meet remaining goals and to improve function and reduction of symptoms. Added bridges to HEP.  -AC        PT Plan    PT Frequency 2x/week;1x/week;Other (comment)  Next week go down to 1x/week  -AC     PT Plan Comments Next visit add B QL stretch, superman's.  -           User Key  (r) = Recorded By, (t) = Taken By, (c) = Cosigned By    Initials Name Provider Type    Carlota Ulloa PT Physical Therapist                   OP Exercises     Row Name 10/11/22 1300             Subjective Comments    Subjective Comments Reports back is feeling good today. states it only hurts the most when driving long distances. Pt subjectively reports 60% improvement since initial eval. Reports continued difficulty with sitting prolonged  "periods of time and core stability.  -AC         Subjective Pain    Able to rate subjective pain? yes  -AC      Pre-Treatment Pain Level 2  -AC      Post-Treatment Pain Level 2  -AC         Exercise 1    Exercise Name 1 Pro II LE bike for strength, muscle re-ed, and warmup  -AC      Time 1 10 min  -AC      Additional Comments L 5.0  -AC         Exercise 2    Exercise Name 2 Re-eval  -AC      Reps 2 --  -AC      Time 2 --  -AC         Exercise 3    Exercise Name 3 Open Books  -AC      Reps 3 10 each side  -AC      Time 3 5\" hold  -AC         Exercise 4    Exercise Name 4 DKTC over pball  -AC      Reps 4 20  -AC         Exercise 5    Exercise Name 5 Bridges over  pball  -AC      Sets 5 2  -AC      Reps 5 10  -AC      Time 5 5 \" hold  -AC         Exercise 6    Exercise Name 6 low planks  -AC      Reps 6 5  -AC      Time 6 20\" hold  -AC         Exercise 7    Exercise Name 7 Child's Pose - R side  -AC      Reps 7 2  -AC      Time 7 1 min ea  -AC         Exercise 8    Exercise Name 8 Thread the needle  -AC      Reps 8 10 reps ea side  -AC         Exercise 9    Exercise Name 9 Quadruped opp arm raises  -AC      Sets 9 --  -AC      Reps 9 10 each side  -AC      Additional Comments + TA contraction  -AC         Exercise 10    Exercise Name 10 Ergonomics training  -AC      Time 10 5 mins  -AC         Exercise 11    Exercise Name 11 Discussion of POC and updated HEP  -AC         Exercise 12    Exercise Name 12 --  -AC            User Key  (r) = Recorded By, (t) = Taken By, (c) = Cosigned By    Initials Name Provider Type    AC Carlota Franklin, PT Physical Therapist                              PT OP Goals     Row Name 10/11/22 1300          PT Short Term Goals    STG Date to Achieve 10/06/22  -AC     STG 1 Pt to be I with HEP with additions prior to next recertification.  -AC     STG 1 Progress Met  -AC     STG 2 Pt to be able to perform 1 min plank with no difficulty or pain present.  -AC     STG 2 Progress Progressing  -AC  "    STG 3 Pt to demo good lifting mechanics with no cueing from PT.  -AC     STG 3 Progress Met  -AC     STG 4 Pt to be able to tolerate >/= 10 mins seated in car with no pain reported.  -AC     STG 4 Progress Met  -AC     STG 5 Pt to improve lumbar flexion AROM to >/= 75°.  -AC     STG 5 Progress Partially Met;Ongoing  -AC        Long Term Goals    LTG 1 Pt to be I with final HEP with good form and no cueing from PT.  -AC     LTG 2 Pt to subjectively report 75% improvement since initial eval indicating improved function.  -AC     LTG 2 Progress Progressing  -AC     LTG 3 Pt to improve B UE and B LE MMT to 5/5.  -AC     LTG 3 Progress Met  -AC     LTG 4 Pt to reduce EFREN score to </= 5% indicating no impairments and able to perform all daily tasks with no difficulty.  -AC     LTG 4 Progress Ongoing  -AC     LTG 5 Pt to report no tenderness to B QL upon palpation indicating symptom reduction.  -     LTG 5 Progress Ongoing  -        Time Calculation    PT Goal Re-Cert Due Date 11/01/22  -           User Key  (r) = Recorded By, (t) = Taken By, (c) = Cosigned By    Initials Name Provider Type     Carlota Franklin, LUZ Physical Therapist                     Outcome Measure Options: Modified Oswestry  Modified Oswestry  Modified Oswestry Score/Comments: 7/50 = 14%      Time Calculation:   Start Time: 1347  Stop Time: 1430  Time Calculation (min): 43 min  Therapy Charges for Today     Code Description Service Date Service Provider Modifiers Qty    37652192992 HC PT THER SUPP EA 15 MIN 10/11/2022 FranklinCarlota, PT GP 2    39977951680 HC PT THER PROC EA 15 MIN 10/11/2022 [place], PT GP 3          PT G-Codes  Outcome Measure Options: Modified Oswestry  Modified Oswestry Score/Comments: 7/50 = 14%         Carlota Franklin PT , DPT 10/11/2022

## 2022-10-13 ENCOUNTER — HOSPITAL ENCOUNTER (OUTPATIENT)
Dept: PHYSICAL THERAPY | Facility: HOSPITAL | Age: 22
Setting detail: THERAPIES SERIES
Discharge: HOME | End: 2022-10-13

## 2022-10-13 DIAGNOSIS — M41.9 SCOLIOSIS, UNSPECIFIED SCOLIOSIS TYPE, UNSPECIFIED SPINAL REGION: Primary | ICD-10-CM

## 2022-10-13 PROCEDURE — 97110 THERAPEUTIC EXERCISES: CPT

## 2022-10-13 NOTE — THERAPY TREATMENT NOTE
Outpatient Physical Therapy Ortho Treatment Note  [facility]     Patient Name: Christen Nicholas  : 2000  MRN: [id number]  Today's Date: 10/13/2022      Visit Date: 10/13/2022  Pt seen for 7 PT sessions  Reported Improvement:  60 %  MD Visit: TBD  Recheck Date: 2022      Therapy Diagnosis:   Thoracic and lumbar mobility deficits 2/2 R scoliosis     Visit Dx:    ICD-10-CM ICD-9-CM   1. Scoliosis, unspecified scoliosis type, unspecified spinal region  M41.9 737.30       There is no problem list on file for this patient.       Past Medical History:   Diagnosis Date   • Anxiety    • Depression         No past surgical history on file.     PT Ortho     Row Name 10/13/22 1400       Subjective Comments    Subjective Comments Reports she is in more pain today due to driving her sister to work in [place] a lot more.  -AC       Subjective Pain    Able to rate subjective pain? yes  -AC    Pre-Treatment Pain Level 4  -AC    Post-Treatment Pain Level 3  -AC          User Key  (r) = Recorded By, (t) = Taken By, (c) = Cosigned By    Initials Name Provider Type    Carlota Ulloa, PT Physical Therapist                             PT Assessment/Plan     Row Name 10/13/22 1300          PT Assessment    Assessment Comments Pt demo difficulty with side planks this date for increased time. Required cueing to maintain neutral spine during low planks. Continue progressing strength and mobility as tolerated. PT provided ed to patient on proper seat position in car and explained to place B towel rolls under hips to prevent increased sacral sitting. PT also ed pt to place pillow vertical behind bike for tactile cue to maintain good posture when driving in [place]. Pt verbalized understanding.  -AC        PT Plan    PT Frequency 1x/week  -AC     PT Plan Comments Next visit re-visit tband rows. Add KB single arm row.  -AC           User Key  (r) = Recorded By, (t) = Taken By, (c) = Cosigned By    Initials Name Provider  "Type    AC Carlota Franklin, PT Physical Therapist                   OP Exercises     Row Name 10/13/22 1400 10/13/22 1300          Subjective Comments    Subjective Comments Reports she is in more pain today due to driving her sister to work in [place] a lot more.  -AC --        Subjective Pain    Able to rate subjective pain? yes  -AC --     Pre-Treatment Pain Level 4  -AC --     Post-Treatment Pain Level 3  -AC --        Exercise 1    Exercise Name 1 -- Pro II LE bike for strength, muscle re-ed, and warmup  -AC     Time 1 -- 8 min  -AC     Additional Comments -- L 5.0  -AC        Exercise 2    Exercise Name 2 -- B st. HS stretch  -AC     Sets 2 -- 2  -AC     Time 2 -- 30 sec  -AC        Exercise 3    Exercise Name 3 -- seated QL stretch  -AC     Sets 3 -- 2  -AC     Time 3 -- 30 sec hold  -AC        Exercise 4    Exercise Name 4 -- DKTC over pball  -AC     Reps 4 -- 20  -AC        Exercise 5    Exercise Name 5 -- Bridges over  pball  -AC     Sets 5 -- 2  -AC     Reps 5 -- 10  -AC     Time 5 -- 5 \" hold  -AC        Exercise 6    Exercise Name 6 -- low planks  -AC     Reps 6 -- 2  -AC     Time 6 -- 30\" hold  -AC     Additional Comments -- Forward  -AC        Exercise 7    Exercise Name 7 -- low side plank  -AC     Reps 7 -- 2  -AC     Time 7 -- 30 sec hold  -AC        Exercise 8    Exercise Name 8 -- Thread the needle  -AC     Reps 8 -- 10 reps ea side  -AC        Exercise 9    Exercise Name 9 -- Quadruped opp arm raises  -AC     Reps 9 -- 10 each side  -AC     Additional Comments -- +TA contraction  -AC        Exercise 10    Exercise Name 10 -- Pt ed on seated position in car  -AC     Time 10 -- 5 mins  -AC        Exercise 11    Exercise Name 11 -- prone alt superman  -AC     Reps 11 -- 20  -AC     Time 11 -- 3\" hold  -AC        Exercise 12    Exercise Name 12 -- seated piriformis stretch  -AC     Reps 12 -- 2  -AC     Time 12 -- 30 sec hold  -AC        Exercise 13    Exercise Name 13 -- child pose stretch- " twds R side  -AC     Sets 13 -- 2  -AC     Time 13 -- 1 min each  -AC           User Key  (r) = Recorded By, (t) = Taken By, (c) = Cosigned By    Initials Name Provider Type    AC Carlota Franklin, PT Physical Therapist                              PT OP Goals     Row Name 10/13/22 1400 10/13/22 1300       PT Short Term Goals    STG Date to Achieve 10/06/22  -AC --    STG 1 Pt to be I with HEP with additions prior to next recertification.  -AC --    STG 1 Progress Met  -AC --    STG 2 Pt to be able to perform 1 min plank with no difficulty or pain present.  -AC --    STG 2 Progress Progressing  -AC --    STG 3 Pt to demo good lifting mechanics with no cueing from PT.  -AC --    STG 3 Progress Met  -AC --    STG 4 Pt to be able to tolerate >/= 10 mins seated in car with no pain reported.  -AC --    STG 4 Progress Met  -AC --    STG 5 Pt to improve lumbar flexion AROM to >/= 75°.  -AC --    STG 5 Progress Partially Met;Ongoing  -AC --       Long Term Goals    LTG 1 Pt to be I with final HEP with good form and no cueing from PT.  -AC --    LTG 2 Pt to subjectively report 75% improvement since initial eval indicating improved function.  -AC --    LTG 2 Progress Progressing  -AC --    LTG 3 Pt to improve B UE and B LE MMT to 5/5.  -AC --    LTG 3 Progress Met  -AC --    LTG 4 Pt to reduce EFREN score to </= 5% indicating no impairments and able to perform all daily tasks with no difficulty.  -AC --    LTG 4 Progress Ongoing  -AC --    LTG 5 Pt to report no tenderness to B QL upon palpation indicating symptom reduction.  -AC --    LTG 5 Progress Ongoing  -AC --       Time Calculation    PT Goal Re-Cert Due Date -- 11/01/22  -          User Key  (r) = Recorded By, (t) = Taken By, (c) = Cosigned By    Initials Name Provider Type    AC Carlota Franklin, PT Physical Therapist                               Time Calculation:   Start Time: 1351  Stop Time: 1430  Time Calculation (min): 39 min  Therapy Charges for Today     Code  Description Service Date Service Provider Modifiers Qty    23097836914 HC PT THER SUPP EA 15 MIN 10/13/2022 Carlota Franklin, PT GP 1    29804673909 HC PT THER PROC EA 15 MIN 10/13/2022 Carlota Franklin, PT GP 3                    Autumn Franklin, LUZ , DPT 10/13/2022

## 2022-10-18 ENCOUNTER — APPOINTMENT (OUTPATIENT)
Dept: PHYSICAL THERAPY | Facility: HOSPITAL | Age: 22
End: 2022-10-18

## 2022-10-20 ENCOUNTER — HOSPITAL ENCOUNTER (OUTPATIENT)
Dept: PHYSICAL THERAPY | Facility: HOSPITAL | Age: 22
Setting detail: THERAPIES SERIES
Discharge: HOME | End: 2022-10-20

## 2022-10-20 DIAGNOSIS — M41.9 SCOLIOSIS, UNSPECIFIED SCOLIOSIS TYPE, UNSPECIFIED SPINAL REGION: Primary | ICD-10-CM

## 2022-10-20 PROCEDURE — 97110 THERAPEUTIC EXERCISES: CPT

## 2022-10-20 NOTE — THERAPY TREATMENT NOTE
Outpatient Physical Therapy Ortho Treatment Note  [facility]     Patient Name: Christen Nicholas  : 2000  MRN: [id number]  Today's Date: 10/20/2022     Pt seen for 8 PT sessions  Reported Improvement:  70 %  MD Visit: leena  Recheck Date: 2022    Therapy Diagnosis:  Thoracic and lumbar mobility deficits 2/2 R scoliosis         Visit Date: 10/20/2022    Visit Dx:    ICD-10-CM ICD-9-CM   1. Scoliosis, unspecified scoliosis type, unspecified spinal region  M41.9 737.30       There is no problem list on file for this patient.       Past Medical History:   Diagnosis Date   • Anxiety    • Depression         No past surgical history on file.     PT Ortho     Row Name 10/20/22 1300       Subjective Comments    Subjective Comments Back is feeling pretty good today.  better than normal  -       Subjective Pain    Able to rate subjective pain? yes  -    Pre-Treatment Pain Level 2  -          User Key  (r) = Recorded By, (t) = Taken By, (c) = Cosigned By    Initials Name Provider Type    Anum Winters PTA Physical Therapist Assistant                             PT Assessment/Plan     Row Name 10/20/22 1300          PT Assessment    Assessment Comments Good effort with all therex. No reports of increased pain .  Good tolerance with mofidied side plank with less knee pain.  No reports of increased pain .  -        PT Plan    PT Frequency 1x/week  -     PT Plan Comments mext visit add wall pushups  -           User Key  (r) = Recorded By, (t) = Taken By, (c) = Cosigned By    Initials Name Provider Type    Anum Winters PTA Physical Therapist Assistant                 Modalities     Row Name 10/20/22 1300             Subjective Pain    Post-Treatment Pain Level 0  -            User Key  (r) = Recorded By, (t) = Taken By, (c) = Cosigned By    Initials Name Provider Type    Anum Winters PTA Physical Therapist Assistant               OP Exercises     Row Name 10/20/22 1300              "Subjective Comments    Subjective Comments Back is feeling pretty good today.  better than normal  -JW         Subjective Pain    Able to rate subjective pain? yes  -JW      Pre-Treatment Pain Level 2  -JW      Post-Treatment Pain Level 0  -JW         Exercise 1    Exercise Name 1 Pro II LE bike for strength, muscle re-ed, and warmup  -JW      Time 1 10  -JW      Additional Comments L 6.0  -JW         Exercise 2    Exercise Name 2 B st. HS stretch  -JW      Reps 2 2  -JW      Time 2 30  -JW         Exercise 3    Exercise Name 3 seated QL stretch  -JW      Reps 3 2  -JW      Time 3 30  -JW         Exercise 4    Exercise Name 4 Supine Figure 4 piriformis st  -JW      Reps 4 2  -JW      Time 4 30  -JW         Exercise 5    Exercise Name 5 DKTC over pball  -JW         Exercise 6    Exercise Name 6 Bridges over pball  -JW      Sets 6 --  -JW      Reps 6 20  -JW      Time 6 5\" hold  -JW      Additional Comments arms x'd  -JW         Exercise 7    Exercise Name 7 Low plank - Prone  -JW      Reps 7 2  -JW      Time 7 30 sec hold  -JW         Exercise 8    Exercise Name 8 Side plank - low  -JW      Reps 8 2  -JW      Time 8 30  -JW      Additional Comments Modified to knees bent  -JW         Exercise 9    Exercise Name 9 Tband Rows; low  -JW      Reps 9 20  -JW      Additional Comments GTB  -JW         Exercise 10    Exercise Name 10 Tband Rows: mid  -JW      Reps 10 20  -JW      Additional Comments GTB  -JW         Exercise 11    Exercise Name 11 Single arm rows  -JW      Sets 11 2  -JW      Reps 11 10  -JW      Additional Comments 6# KB  -JW            User Key  (r) = Recorded By, (t) = Taken By, (c) = Cosigned By    Initials Name Provider Type    Anum Winters PTA Physical Therapist Assistant                              PT OP Goals     Row Name 10/20/22 1300          PT Short Term Goals    STG Date to Achieve 10/06/22  -JW     STG 1 Pt to be I with HEP with additions prior to next recertification.  -JW     STG 1 " Progress Met  -     STG 2 Pt to be able to perform 1 min plank with no difficulty or pain present.  -     STG 2 Progress Progressing  -     STG 3 Pt to demo good lifting mechanics with no cueing from PT.  -     STG 3 Progress Met  -     STG 4 Pt to be able to tolerate >/= 10 mins seated in car with no pain reported.  -     STG 4 Progress Met  -     STG 5 Pt to improve lumbar flexion AROM to >/= 75°.  -     STG 5 Progress Partially Met;Ongoing  -        Long Term Goals    LTG 1 Pt to be I with final HEP with good form and no cueing from PT.  -     LTG 2 Pt to subjectively report 75% improvement since initial eval indicating improved function.  -     LTG 2 Progress Progressing  -     LTG 3 Pt to improve B UE and B LE MMT to 5/5.  -     LTG 3 Progress Met  -     LTG 4 Pt to reduce EFREN score to </= 5% indicating no impairments and able to perform all daily tasks with no difficulty.  -     LTG 4 Progress Ongoing  -     LTG 5 Pt to report no tenderness to B QL upon palpation indicating symptom reduction.  -     LTG 5 Progress Ongoing  -        Time Calculation    PT Goal Re-Cert Due Date 11/01/22  -           User Key  (r) = Recorded By, (t) = Taken By, (c) = Cosigned By    Initials Name Provider Type    Anum Winters PTA Physical Therapist Assistant                Therapy Education  Education Details: Green tband for rows.  Given: HEP, Posture/body mechanics  Program: Reinforced, Progressed  How Provided: Verbal, Demonstration  Provided to: Patient  Level of Understanding: Verbalized              Time Calculation:   Start Time: 1300  Stop Time: 1344  Time Calculation (min): 44 min  Therapy Charges for Today     Code Description Service Date Service Provider Modifiers Qty    01065205003 HC PT THER PROC EA 15 MIN 10/20/2022 Anum Villalobos PTA GP, CQ 3    27897316462 HC PT THER SUPP EA 15 MIN 10/20/2022 Anum Villalobos PTA GP, CQ 1                    Anum Villalobos  PTA  10/20/2022

## 2022-10-25 ENCOUNTER — APPOINTMENT (OUTPATIENT)
Dept: PHYSICAL THERAPY | Facility: HOSPITAL | Age: 22
End: 2022-10-25

## 2022-10-26 ENCOUNTER — HOSPITAL ENCOUNTER (OUTPATIENT)
Dept: PHYSICAL THERAPY | Facility: HOSPITAL | Age: 22
Setting detail: THERAPIES SERIES
Discharge: HOME | End: 2022-10-26

## 2022-10-26 DIAGNOSIS — M41.9 SCOLIOSIS, UNSPECIFIED SCOLIOSIS TYPE, UNSPECIFIED SPINAL REGION: Primary | ICD-10-CM

## 2022-10-26 PROCEDURE — 97110 THERAPEUTIC EXERCISES: CPT

## 2022-10-26 NOTE — THERAPY TREATMENT NOTE
Outpatient Physical Therapy Ortho Treatment Note  [facility]     Patient Name: Christen Nicholas  : 2000  MRN: [id number]  Today's Date: 10/26/2022     Pt seen for 9 PT sessions  Reported Improvement:  70 %  MD Visit: leena  Recheck Date: 2022    Therapy Diagnosis:   Thoracic and lumbar mobility deficits 2/2 R scoliosis         Visit Date: 10/26/2022    Visit Dx:    ICD-10-CM ICD-9-CM   1. Scoliosis, unspecified scoliosis type, unspecified spinal region  M41.9 737.30       There is no problem list on file for this patient.       Past Medical History:   Diagnosis Date   • Anxiety    • Depression         No past surgical history on file.     PT Ortho     Row Name 10/26/22 1300       Subjective Comments    Subjective Comments No new issues or complaints this date.  -ANGIE       Subjective Pain    Able to rate subjective pain? yes  -ANGIE    Pre-Treatment Pain Level 0  -          User Key  (r) = Recorded By, (t) = Taken By, (c) = Cosigned By    Initials Name Provider Type    Anum Winters PTA Physical Therapist Assistant                             PT Assessment/Plan     Row Name 10/26/22 1400          PT Assessment    Assessment Comments Reports overall improvement of 60-70%.  experiences most pain and discomfort when sitting for long periods of time. Good effort with standing core stabilization therex. Cues for neutral pelvis with wall pushups and decreased anterior pelvic tilt.  No pain reports and verbalizes good complaince with HEP.  -        PT Plan    PT Frequency 1x/week  -     PT Plan Comments Next visit anticipate DC with most goals met.  -ANIGE           User Key  (r) = Recorded By, (t) = Taken By, (c) = Cosigned By    Initials Name Provider Type    Anum Winters PTA Physical Therapist Assistant                   OP Exercises     Row Name 10/26/22 1300             Subjective Comments    Subjective Comments No new issues or complaints this date.  -ANGIE         Subjective Pain    Able to  "rate subjective pain? yes  -JW      Pre-Treatment Pain Level 0  -JW      Post-Treatment Pain Level 0  -JW         Exercise 1    Exercise Name 1 Pro II LE bike for strength, muscle re-ed, and warmup  -JW      Time 1 10  -JW      Additional Comments L 6.0  -JW         Exercise 2    Exercise Name 2 B st. HS stretch  -JW      Reps 2 2  -JW      Time 2 30  -JW         Exercise 3    Exercise Name 3 seated QL stretch  -JW      Reps 3 2  -JW      Time 3 30  -JW         Exercise 4    Exercise Name 4 B seated piriformis st  -JW      Reps 4 2  -JW      Time 4 30  -JW         Exercise 5    Exercise Name 5 Fwd step ups with opp hip extension  -JW      Reps 5 20  -JW      Time 5 6\" step  -JW         Exercise 6    Exercise Name 6 Lateral step ups with opp hip ABD  -JW      Reps 6 20  -JW         Exercise 7    Exercise Name 7 Wall Pushups  -JW      Sets 7 2  -JW      Reps 7 10  -JW         Exercise 8    Exercise Name 8 Verbal review of curretn HEP  -JW            User Key  (r) = Recorded By, (t) = Taken By, (c) = Cosigned By    Initials Name Provider Type    Anum Winters, PTA Physical Therapist Assistant                              PT OP Goals     Row Name 10/26/22 1300          PT Short Term Goals    STG Date to Achieve 10/06/22  -     STG 1 Pt to be I with HEP with additions prior to next recertification.  -     STG 1 Progress Met  -     STG 2 Pt to be able to perform 1 min plank with no difficulty or pain present.  -     STG 2 Progress Progressing  -     STG 3 Pt to demo good lifting mechanics with no cueing from PT.  -     STG 3 Progress Met  -     STG 4 Pt to be able to tolerate >/= 10 mins seated in car with no pain reported.  -     STG 4 Progress Met  -     STG 5 Pt to improve lumbar flexion AROM to >/= 75°.  -     STG 5 Progress Partially Met;Ongoing  -        Long Term Goals    LTG 1 Pt to be I with final HEP with good form and no cueing from PT.  -     LTG 2 Pt to subjectively report 75% " improvement since initial eval indicating improved function.  -     LTG 2 Progress Progressing  -     LTG 3 Pt to improve B UE and B LE MMT to 5/5.  -     LTG 3 Progress Met  -     LTG 4 Pt to reduce EFREN score to </= 5% indicating no impairments and able to perform all daily tasks with no difficulty.  -     LTG 4 Progress Ongoing  -     LTG 5 Pt to report no tenderness to B QL upon palpation indicating symptom reduction.  -     LTG 5 Progress Ongoing  -        Time Calculation    PT Goal Re-Cert Due Date 11/01/22  -           User Key  (r) = Recorded By, (t) = Taken By, (c) = Cosigned By    Initials Name Provider Type    Anum Winters PTA Physical Therapist Assistant                Therapy Education  Education Details: Wall pushups  Given: HEP, Posture/body mechanics  Program: New, Reinforced  How Provided: Verbal, Demonstration  Provided to: Patient  Level of Understanding: Verbalized, Demonstrated              Time Calculation:   Start Time: 1347  Stop Time: 1428  Time Calculation (min): 41 min  Therapy Charges for Today     Code Description Service Date Service Provider Modifiers Qty    19282862666 HC PT THER PROC EA 15 MIN 10/26/2022 Anum Villalobos PTA GP, CQ 3    86953660520 HC PT THER SUPP EA 15 MIN 10/26/2022 Anum Villalobos PTA GP, CQ 1                    Anum Villalobos PTA  10/26/2022

## 2022-11-01 ENCOUNTER — HOSPITAL ENCOUNTER (OUTPATIENT)
Dept: PHYSICAL THERAPY | Facility: HOSPITAL | Age: 22
Setting detail: THERAPIES SERIES
Discharge: HOME | End: 2022-11-01

## 2022-11-01 DIAGNOSIS — M41.9 SCOLIOSIS, UNSPECIFIED SCOLIOSIS TYPE, UNSPECIFIED SPINAL REGION: Primary | ICD-10-CM

## 2022-11-01 NOTE — THERAPY DISCHARGE NOTE
Outpatient Physical Therapy Ortho Progress Note/Discharge Summary  [facility]     Patient Name: Christen Nicholas  : 2000  MRN: [id number]  Today's Date: 2022      Visit Date: 2022     Pt seen for 10 PT sessions  Reported Improvement:  75%  MD Visit: tbd  Recheck Date: N/A      Therapy Diagnosis:   Thoracic and lumbar mobility deficits 2/2 R scoliosis     Visit Dx:    ICD-10-CM ICD-9-CM   1. Scoliosis, unspecified scoliosis type, unspecified spinal region  M41.9 737.30       There is no problem list on file for this patient.       Past Medical History:   Diagnosis Date   • Anxiety    • Depression         No past surgical history on file.     PT Ortho     Row Name 22 1300       Subjective Comments    Subjective Comments No new complaints. Reports 75% improvement since initial eval. Pt states no new issues or complaints. Pt in agreement for DC this date.  -AC       Subjective Pain    Able to rate subjective pain? yes  -AC    Pre-Treatment Pain Level 0  -AC    Post-Treatment Pain Level 0  -AC       Posture/Observations    Posture/Observations Comments No distress; posture has improved significantly since initial eval. Equal symmetry between shoulder blades. Only slight inbalance at hips with L iliac crest slightly higher than R.  -AC       Cervical/Shoulder ROM Screen    Cervical flexion Normal  -AC    Cervical extension Normal  -AC    Cervical lateral flexion Normal  -AC    Cervical rotation Normal  -AC    Shoulder elevation  Normal  -AC       Lumbosacral Palpation    Lumbosacral Palpation? Yes  -AC    Quadratus Lumborum Left:;Tender  -AC       Head/Neck/Trunk    Trunk Extension AROM 25°  -AC    Trunk Flexion AROM 80°  -AC    Trunk Lt Lateral Flexion AROM 100%  -AC    Trunk Rt Lateral Flexion AROM 100%  -AC    Trunk Lt Rotation AROM 100%  -AC    Trunk Rt Rotation AROM 100%  -AC    Head/Neck/Trunk Comments All tspine AROM 100%  -AC       MMT (Manual Muscle Testing)    General MMT Comments  All B UE MMT 4+/5. All B LE MMT 5/5.  -AC       Sensation    Sensation WNL? WNL  -AC    Light Touch No apparent deficits  -AC       Upper Extremity Flexibility    Pect Minor Bilateral:;Mildly limited  -AC    Pect Major Bilateral:;Mildly limited  -AC       Lower Extremity Flexibility    Hamstrings Bilateral:;Mildly limited  -AC    Hip Flexors Bilateral:;Mildly limited  -AC       Balance Skills Training    Balance Comments WNL.  -AC       Transfers    Comment, (Transfers) I with all transfers.  -AC       Gait/Stairs (Locomotion)    Comment, (Gait/Stairs) I with ambuation without AD. No significant gait deviations noted.  -AC          User Key  (r) = Recorded By, (t) = Taken By, (c) = Cosigned By    Initials Name Provider Type    Carlota Ulloa, PT Physical Therapist                             PT Assessment/Plan     Row Name 11/01/22 1300          PT Assessment    Functional Limitations Limitations in community activities;Performance in work activities  -AC     Impairments Impaired flexibility  -AC     Assessment Comments patient subjectively reports 75% improvement since initial eval. Pt has met 8/10 goals and has partially met 1 remaining LTG. Pt posture has improved significantly since initial eval with normal alignment present at shoulders. Pt also presents with improved trunk AROM, flexibility, and strength. Pt able to complete 1 min plank this date with only slight sag at abdominal region. Pt only complaint at this time is increased pain after long car rides. However, pt reports the pain eases after she does her stretches. Pt woud benefit from DC at this time due to reaching functional independence and indepenence with symptom management. Pt provided with final HEP.  -AC     Please refer to paper survey for additional self-reported information Yes  -AC     Rehab Potential Good  -AC     Patient/caregiver participated in establishment of treatment plan and goals Yes  -AC     Patient would benefit from skilled  "therapy intervention Yes  -AC        PT Plan    PT Frequency Other (comment)  DC  -AC     Predicted Duration of Therapy Intervention (PT) DC  -AC     PT Plan Comments N/A  -AC           User Key  (r) = Recorded By, (t) = Taken By, (c) = Cosigned By    Initials Name Provider Type    AC Carlota Franklin, PT Physical Therapist                     OP Exercises     Row Name 11/01/22 1300             Subjective Comments    Subjective Comments No new complaints. Reports 75% improvement since initial eval. Pt states no new issues or complaints. Pt in agreement for DC this date.  -AC         Subjective Pain    Able to rate subjective pain? yes  -AC      Pre-Treatment Pain Level 0  -AC      Post-Treatment Pain Level 0  -AC         Exercise 1    Exercise Name 1 Pro II LE bike for strength, muscle re-ed, and warmup  -AC      Time 1 10  -AC      Additional Comments L 6.5  -AC         Exercise 2    Exercise Name 2 B st. HS stretch  -AC      Reps 2 2  -AC      Time 2 30  -AC         Exercise 3    Exercise Name 3 seated QL stretch  -AC      Reps 3 2  -AC      Time 3 30  -AC         Exercise 4    Exercise Name 4 B seated piriformis st  -AC      Reps 4 2  -AC      Time 4 30  -AC         Exercise 5    Exercise Name 5 Fwd step ups with opp hip extension  -AC      Reps 5 20  -AC      Time 5 6\" step  -AC         Exercise 6    Exercise Name 6 Lateral step ups with opp hip ABD  -AC      Reps 6 20  -AC      Time 6 6\" step  -AC         Exercise 7    Exercise Name 7 low plank - prone  -AC      Time 7 1 min  -AC         Exercise 8    Exercise Name 8 elephant stretch  -AC      Sets 8 2  -AC      Time 8 30 sec hold  -AC         Exercise 9    Exercise Name 9 B UT/LS stretch  -AC      Reps 9 1  -AC      Time 9 30 sec hold  -AC      Additional Comments each side  -AC         Exercise 10    Exercise Name 10 child pose stretch twd R side  -AC      Sets 10 2  -AC      Time 10 1 min each  -AC         Exercise 11    Exercise Name 11 Verbal review of " final HEP  -AC            User Key  (r) = Recorded By, (t) = Taken By, (c) = Cosigned By    Initials Name Provider Type    AC Carlota Franklin, PT Physical Therapist                                PT OP Goals     Row Name 11/01/22 1300          PT Short Term Goals    STG Date to Achieve 10/06/22  -     STG 1 Pt to be I with HEP with additions prior to next recertification.  -AC     STG 1 Progress Met  -     STG 2 Pt to be able to perform 1 min plank with no difficulty or pain present.  -AC     STG 2 Progress Met  -     STG 3 Pt to demo good lifting mechanics with no cueing from PT.  -AC     STG 3 Progress Met  -     STG 4 Pt to be able to tolerate >/= 10 mins seated in car with no pain reported.  -     STG 4 Progress Met  -     STG 5 Pt to improve lumbar flexion AROM to >/= 75°.  -     STG 5 Progress Met  -        Long Term Goals    LTG 1 Pt to be I with final HEP with good form and no cueing from PT.  -     LTG 1 Progress Met  -     LTG 2 Pt to subjectively report 75% improvement since initial eval indicating improved function.  -     LTG 2 Progress Met  -     LTG 3 Pt to improve B UE and B LE MMT to 5/5.  -AC     LTG 3 Progress Met  -AC     LTG 4 Pt to reduce EFREN score to </= 5% indicating no impairments and able to perform all daily tasks with no difficulty.  -AC     LTG 4 Progress Not Met  -AC     LTG 5 Pt to report no tenderness to B QL upon palpation indicating symptom reduction.  -     LTG 5 Progress Partially Met  -        Time Calculation    PT Goal Re-Cert Due Date --  DC  -AC           User Key  (r) = Recorded By, (t) = Taken By, (c) = Cosigned By    Initials Name Provider Type    Carlota Ulloa, PT Physical Therapist                Therapy Education  Given: HEP, Symptoms/condition management, Pain management  Program: New, Reinforced  How Provided: Verbal, Demonstration, Written  Provided to: Patient  Level of Understanding: Teach back education performed, Verbalized,  Demonstrated    Outcome Measure Options: Modified Oswestry  Modified Oswestry  Modified Oswestry Score/Comments: 8/50 = 16%      Time Calculation:   Start Time: 1301  Stop Time: 1346  Time Calculation (min): 45 min      PT G-Codes  Outcome Measure Options: Modified Oswestry  Modified Oswestry Score/Comments: 8/50 = 16%     OP PT Discharge Summary  Date of Discharge: 11/01/22  Reason for Discharge: Maximum functional potential achieved, Independent  Outcomes Achieved: Patient able to partially acheive established goals, Refer to plan of care for updates on goals achieved  Discharge Destination: Home with home program      Carlota Franklin PT , DPT  11/1/2022